# Patient Record
Sex: MALE | Employment: UNEMPLOYED | URBAN - METROPOLITAN AREA
[De-identification: names, ages, dates, MRNs, and addresses within clinical notes are randomized per-mention and may not be internally consistent; named-entity substitution may affect disease eponyms.]

---

## 2023-11-17 ENCOUNTER — HOSPITAL ENCOUNTER (EMERGENCY)
Age: 28
Discharge: HOME | End: 2023-11-18
Attending: EMERGENCY MEDICINE

## 2023-11-17 ENCOUNTER — ANESTHESIA (OUTPATIENT)
Dept: ENDOSCOPY | Age: 28
End: 2023-11-17

## 2023-11-17 ENCOUNTER — ANESTHESIA EVENT (OUTPATIENT)
Dept: ENDOSCOPY | Age: 28
End: 2023-11-17

## 2023-11-17 ENCOUNTER — APPOINTMENT (OUTPATIENT)
Dept: GENERAL RADIOLOGY | Age: 28
End: 2023-11-17

## 2023-11-17 DIAGNOSIS — T18.128A ESOPHAGEAL OBSTRUCTION DUE TO FOOD IMPACTION: Primary | ICD-10-CM

## 2023-11-17 DIAGNOSIS — K22.2 ESOPHAGEAL OBSTRUCTION DUE TO FOOD IMPACTION: Primary | ICD-10-CM

## 2023-11-17 LAB
ANION GAP SERPL CALCULATED.3IONS-SCNC: 13 MMOL/L (ref 3–16)
BASOPHILS # BLD: 0.1 K/UL (ref 0–0.2)
BASOPHILS NFR BLD: 1 %
BUN SERPL-MCNC: 10 MG/DL (ref 7–20)
CALCIUM SERPL-MCNC: 9.4 MG/DL (ref 8.3–10.6)
CHLORIDE SERPL-SCNC: 103 MMOL/L (ref 99–110)
CO2 SERPL-SCNC: 24 MMOL/L (ref 21–32)
CREAT SERPL-MCNC: 1 MG/DL (ref 0.9–1.3)
DEPRECATED RDW RBC AUTO: 14.1 % (ref 12.4–15.4)
EOSINOPHIL # BLD: 0.3 K/UL (ref 0–0.6)
EOSINOPHIL NFR BLD: 2.6 %
GFR SERPLBLD CREATININE-BSD FMLA CKD-EPI: >60 ML/MIN/{1.73_M2}
GLUCOSE SERPL-MCNC: 117 MG/DL (ref 70–99)
HCT VFR BLD AUTO: 37.9 % (ref 40.5–52.5)
HGB BLD-MCNC: 12.4 G/DL (ref 13.5–17.5)
LYMPHOCYTES # BLD: 4.1 K/UL (ref 1–5.1)
LYMPHOCYTES NFR BLD: 42.9 %
MAGNESIUM SERPL-MCNC: 1.8 MG/DL (ref 1.8–2.4)
MCH RBC QN AUTO: 26.4 PG (ref 26–34)
MCHC RBC AUTO-ENTMCNC: 32.6 G/DL (ref 31–36)
MCV RBC AUTO: 81 FL (ref 80–100)
MONOCYTES # BLD: 0.9 K/UL (ref 0–1.3)
MONOCYTES NFR BLD: 9.9 %
NEUTROPHILS # BLD: 4.2 K/UL (ref 1.7–7.7)
NEUTROPHILS NFR BLD: 43.6 %
PLATELET # BLD AUTO: 262 K/UL (ref 135–450)
PMV BLD AUTO: 8.9 FL (ref 5–10.5)
POTASSIUM SERPL-SCNC: 3.4 MMOL/L (ref 3.5–5.1)
RBC # BLD AUTO: 4.68 M/UL (ref 4.2–5.9)
SODIUM SERPL-SCNC: 140 MMOL/L (ref 136–145)
WBC # BLD AUTO: 9.6 K/UL (ref 4–11)

## 2023-11-17 PROCEDURE — 96375 TX/PRO/DX INJ NEW DRUG ADDON: CPT

## 2023-11-17 PROCEDURE — 6360000002 HC RX W HCPCS: Performed by: EMERGENCY MEDICINE

## 2023-11-17 PROCEDURE — 3700000001 HC ADD 15 MINUTES (ANESTHESIA): Performed by: INTERNAL MEDICINE

## 2023-11-17 PROCEDURE — 2709999900 HC NON-CHARGEABLE SUPPLY: Performed by: INTERNAL MEDICINE

## 2023-11-17 PROCEDURE — 80048 BASIC METABOLIC PNL TOTAL CA: CPT

## 2023-11-17 PROCEDURE — 7100000000 HC PACU RECOVERY - FIRST 15 MIN: Performed by: INTERNAL MEDICINE

## 2023-11-17 PROCEDURE — 3700000000 HC ANESTHESIA ATTENDED CARE: Performed by: INTERNAL MEDICINE

## 2023-11-17 PROCEDURE — 2580000003 HC RX 258: Performed by: EMERGENCY MEDICINE

## 2023-11-17 PROCEDURE — 99285 EMERGENCY DEPT VISIT HI MDM: CPT

## 2023-11-17 PROCEDURE — 6360000002 HC RX W HCPCS: Performed by: ANESTHESIOLOGY

## 2023-11-17 PROCEDURE — 83735 ASSAY OF MAGNESIUM: CPT

## 2023-11-17 PROCEDURE — 3609012900 HC EGD FOREIGN BODY REMOVAL: Performed by: INTERNAL MEDICINE

## 2023-11-17 PROCEDURE — 71045 X-RAY EXAM CHEST 1 VIEW: CPT

## 2023-11-17 PROCEDURE — 2720000010 HC SURG SUPPLY STERILE: Performed by: INTERNAL MEDICINE

## 2023-11-17 PROCEDURE — 85025 COMPLETE CBC W/AUTO DIFF WBC: CPT

## 2023-11-17 PROCEDURE — 96374 THER/PROPH/DIAG INJ IV PUSH: CPT

## 2023-11-17 PROCEDURE — 7100000001 HC PACU RECOVERY - ADDTL 15 MIN: Performed by: INTERNAL MEDICINE

## 2023-11-17 RX ORDER — SODIUM CHLORIDE, SODIUM LACTATE, POTASSIUM CHLORIDE, AND CALCIUM CHLORIDE .6; .31; .03; .02 G/100ML; G/100ML; G/100ML; G/100ML
1000 INJECTION, SOLUTION INTRAVENOUS ONCE
Status: COMPLETED | OUTPATIENT
Start: 2023-11-17 | End: 2023-11-17

## 2023-11-17 RX ORDER — SUCCINYLCHOLINE CHLORIDE 20 MG/ML
INJECTION INTRAMUSCULAR; INTRAVENOUS PRN
Status: DISCONTINUED | OUTPATIENT
Start: 2023-11-17 | End: 2023-11-17 | Stop reason: SDUPTHER

## 2023-11-17 RX ORDER — ONDANSETRON 2 MG/ML
4 INJECTION INTRAMUSCULAR; INTRAVENOUS ONCE
Status: COMPLETED | OUTPATIENT
Start: 2023-11-17 | End: 2023-11-17

## 2023-11-17 RX ORDER — GLUCAGON 1 MG/ML
1 KIT INJECTION ONCE
Status: COMPLETED | OUTPATIENT
Start: 2023-11-17 | End: 2023-11-17

## 2023-11-17 RX ORDER — PROPOFOL 10 MG/ML
INJECTION, EMULSION INTRAVENOUS PRN
Status: DISCONTINUED | OUTPATIENT
Start: 2023-11-17 | End: 2023-11-17 | Stop reason: SDUPTHER

## 2023-11-17 RX ADMIN — SODIUM CHLORIDE, POTASSIUM CHLORIDE, SODIUM LACTATE AND CALCIUM CHLORIDE 1000 ML: 600; 310; 30; 20 INJECTION, SOLUTION INTRAVENOUS at 19:29

## 2023-11-17 RX ADMIN — PROPOFOL 100 MG: 10 INJECTION, EMULSION INTRAVENOUS at 22:21

## 2023-11-17 RX ADMIN — PROPOFOL 100 MG: 10 INJECTION, EMULSION INTRAVENOUS at 22:18

## 2023-11-17 RX ADMIN — PROPOFOL 100 MG: 10 INJECTION, EMULSION INTRAVENOUS at 22:24

## 2023-11-17 RX ADMIN — PROPOFOL 100 MG: 10 INJECTION, EMULSION INTRAVENOUS at 22:16

## 2023-11-17 RX ADMIN — ONDANSETRON 4 MG: 2 INJECTION INTRAMUSCULAR; INTRAVENOUS at 19:31

## 2023-11-17 RX ADMIN — SUCCINYLCHOLINE CHLORIDE 120 MG: 20 INJECTION, SOLUTION INTRAMUSCULAR; INTRAVENOUS; PARENTERAL at 22:24

## 2023-11-17 RX ADMIN — GLUCAGON 1 MG: KIT at 19:31

## 2023-11-17 ASSESSMENT — PAIN SCALES - GENERAL: PAINLEVEL_OUTOF10: 0

## 2023-11-17 ASSESSMENT — PAIN - FUNCTIONAL ASSESSMENT: PAIN_FUNCTIONAL_ASSESSMENT: 0-10

## 2023-11-18 VITALS
HEIGHT: 70 IN | TEMPERATURE: 99 F | HEART RATE: 88 BPM | DIASTOLIC BLOOD PRESSURE: 84 MMHG | RESPIRATION RATE: 20 BRPM | SYSTOLIC BLOOD PRESSURE: 128 MMHG | OXYGEN SATURATION: 99 %

## 2023-11-18 ASSESSMENT — PAIN SCALES - GENERAL: PAINLEVEL_OUTOF10: 0

## 2023-11-18 ASSESSMENT — PAIN - FUNCTIONAL ASSESSMENT: PAIN_FUNCTIONAL_ASSESSMENT: 0-10

## 2023-11-18 NOTE — ED PROVIDER NOTES
[address] [facility] ENCOUNTER          ATTENDING PHYSICIAN NOTE       Date of evaluation: 11/17/2023    ADDENDUM:      Care of this patient was assumed from my colleague, Dr. Loreto Andersen. The patient was seen for Food Stuck in Throat (Pt. states he was eating a piece of lamb, became lodged in throat. O2 100% on RA upon arrival to ED. )  . The patient's initial evaluation and plan have been discussed with the prior provider who initially evaluated the patient. Nursing Notes, Past Medical Hx, Past Surgical Hx, Social Hx, Allergies, and Family Hx were all reviewed. At the time of turnover, the remaining items are still pending in her work-up and will be followed up by myself: f/u after endo FB removal. .       Diagnostic Results     Labs:  Please see electronic medical record for any tests performed in the ED     Radiology:  Please see electronic medical record for any tests performed in the ED    Procedures     ED Course     The patient was given the following medications:  Orders Placed This Encounter   Medications    DISCONTD: glucagon injection 1 mg    ondansetron (ZOFRAN) injection 4 mg    lactated ringers bolus bolus 1,000 mL    DISCONTD: glucagon injection 1 mg    glucagon injection 1 mg     CONSULTS:  IP CONSULT TO GI    Scotty Tran / SOOC / Brett Whalendenny is a 24 y.o. male with no past medical history who presented to the emergency department as a transfer from liquid with food bolus who subsequently was evaluated in endoscopy with removal of impacted food and return to the emergency department. He is tolerating p.o. and has no complaints apart from some mild soreness. Discussed adequate chewing and small food boluses followed by water or other liquids with every bite. If he continues to have any difficulty swallowing, he can follow-up with primary care physician and discuss GI referral.    Risk:  The patient presented for concern of food impaction.     Problem: The patient had an acute problem/illness that posed a threat to life or bodily function. Data:  External data reviewed: No. Please see HPI and/or MDM. Labs: Not ordered. Details documented/discussed in MDM. Independent historian: None. Please see HPI and/or MDM. Radiology: Not ordered. Details documented in MDM. Discussed with additional healthcare providers: Consultant - please see above for full details. Medications given in the ED or prescribed at discharge:  Medications   ondansetron [facility]) injection 4 mg (4 mg IntraVENous Given 11/17/23 1931)   lactated ringers bolus bolus 1,000 mL (0 mLs IntraVENous Stopped 11/17/23 2137)   glucagon injection 1 mg (1 mg IntraVENous Given 11/17/23 1931)       Disposition: Discharge    Clinical Impression     1. Esophageal obstruction due to food impaction        Disposition     PATIENT REFERRED TO:  No follow-up provider specified.     DISCHARGE MEDICATIONS:  New Prescriptions    No medications on file       DISPOSITION Decision To Transfer 11/17/2023 08:15:38 PM       Hola Mariee MD  11/18/23 3182

## 2023-11-18 NOTE — ED NOTES
Pt to [facility] per [facility].       Kosta Watkins RN  11/17/23 2059

## 2023-11-18 NOTE — ANESTHESIA PRE PROCEDURE
PM    CO2 24 11/17/2023 07:18 PM    BUN 10 11/17/2023 07:18 PM    CREATININE 1.0 11/17/2023 07:18 PM    LABGLOM >60 11/17/2023 07:18 PM    GLUCOSE 117 11/17/2023 07:18 PM    CALCIUM 9.4 11/17/2023 07:18 PM       POC Tests: No results for input(s): \"POCGLU\", \"POCNA\", \"POCK\", \"POCCL\", \"POCBUN\", \"POCHEMO\", \"POCHCT\" in the last 72 hours. Coags: No results found for: \"PROTIME\", \"INR\", \"APTT\"    HCG (If Applicable): No results found for: \"PREGTESTUR\", \"PREGSERUM\", \"HCG\", \"HCGQUANT\"     ABGs: No results found for: \"PHART\", \"PO2ART\", \"JCJ3YQP\", \"FJN3JVM\", \"BEART\", \"D2AIIWOW\"     Type & Screen (If Applicable):  No results found for: \"LABABO\", \"LABRH\"    Drug/Infectious Status (If Applicable):  No results found for: \"HIV\", \"HEPCAB\"    COVID-19 Screening (If Applicable): No results found for: \"COVID19\"        Anesthesia Evaluation    Airway: Mallampati: II  TM distance: >3 FB   Neck ROM: full  Mouth opening: > = 3 FB   Dental:          Pulmonary:                              Cardiovascular:            Rhythm: regular  Rate: normal                    Neuro/Psych:               GI/Hepatic/Renal:             Endo/Other:                     Abdominal:             Vascular: Other Findings:           Anesthesia Plan      MAC and general     ASA 1 - emergent       Induction: intravenous. Anesthetic plan and risks discussed with patient. Plan discussed with CRNA.                   Ruth Ram MD   11/17/2023

## 2023-11-18 NOTE — PROGRESS NOTES
Received report from Suni Boo RN and Dr. Symone Blank of anesthesiology. Pt alert; speaks [place] only. Breathing easily on 10 L NRB, sats 100%; with Dr. Symone Blank at bedside, this RN removed O2 and pt remains 100% on RA. No distress exhibited. Intermittent dry cough. Currently resting quietly. MD states okay to take pt back to ER.

## 2023-11-18 NOTE — ED NOTES
Report called to [facility] ED RN TYREL Call RN and informed of florence Pichardo RN  11/17/23 2100

## 2023-11-18 NOTE — ED PROVIDER NOTES
[facility]          ATTENDING PHYSICIAN NOTE       Date of evaluation: 11/17/2023    Chief Complaint     Food Stuck in Throat (Pt. states he was eating a piece of lamb, became lodged in throat. O2 100% on RA upon arrival to ED. )      History of Present Illness     Dennys Rivera is a 24 y.o. male who presents ***    Review of Systems     Review of Systems    Past Medical, Surgical, Family, and Social History     He has no past medical history on file. He has no past surgical history on file. His family history is not on file. He     Medications     Previous Medications    No medications on file       Allergies     He has no allergies on file. Physical Exam     INITIAL VITALS: BP: (!) 152/81,  , Pulse: (!) 126, Respirations: 20, SpO2: 100 %     General: Well appearing. NAD. HEENT: Pupils are equal, round, and reactive to light. Extraocular muscles are intact. Conjunctivae are clear and moist. No redness or drainage from the eyes. No drainage from the nose. The oropharynx appeared to be normal.    Neck: Supple, with full range of motion. No midline C-spine tenderness to palpation, crepitus, or step-offs. Back: No CVA tenderness. No midline T or L spine tenderness to palpation, crepitus, or step-offs. Chest: Not tender to palpation. Cardiovascular: Normal S1-S2 without murmur rub or gallop. 2+ radial pulses bilaterally. 2+ DP pulses bilaterally. Respiratory: Unlabored breathing with equal chest rise and fall. Lungs are clear to auscultation bilaterally. No adventitious lung sounds heard. Abdomen: Soft and nontender, without guarding or rebound tenderness. No masses or hepatosplenomegaly. Skin: Warm and dry, without rashes or ecchymoses, lacerations or abrasions. Neuro: Alert and oriented x3. No focal neurologic deficits are noted. *** (Detailed Option)  Cranial nerves II-XII are grossly intact to testing.   Strength is 5/5 in all proximal and

## 2023-11-18 NOTE — ANESTHESIA POSTPROCEDURE EVALUATION
Department of Anesthesiology  Postprocedure Note    Patient: López Johnson  MRN: [id number]  YOB: 1995  Date of evaluation: 11/17/2023      Procedure Summary       Date: 11/17/23 Room / Location: [address] [place] [address]    Anesthesia Start: 2215 Anesthesia Stop: 2319    Procedure: EGD FOREIGN BODY REMOVAL Diagnosis:       Food impaction of esophagus, initial encounter      (Food impaction of esophagus, initial encounter Jordan Rodas)    Surgeons: Jerman Walters MD Responsible Provider: Ghassan Huitron MD    Anesthesia Type: MAC, general ASA Status: 1 - Emergent            Anesthesia Type: No value filed.     Sophy Phase I: Sophy Score: 10    Sophy Phase II:        Anesthesia Post Evaluation    Patient location during evaluation: PACU  Level of consciousness: awake  Complications: no  Multimodal analgesia pain management approach

## 2023-11-18 NOTE — BRIEF OP NOTE
EGD:   food bolus removed. Mild proximal esophageal ring. Rec:  soft diet            F/u outpt GI for EGD.

## 2023-11-18 NOTE — PROGRESS NOTES
Patient arrived to PACU post EGD FOREIGN BODY REMOVAL with Dr. Caroline ROBBINS. Dr. Abdon Gallegos gave PACU RN report at bedside stating no complications during procedure. Patient shows no signs of pain at this time. Will continue to monitor.

## 2023-11-18 NOTE — ED NOTES
Shania at bedside to take patient to [address] [place] [address], [address] [place] [address], [address] [place] [address]  11/17/23 2734

## 2023-11-18 NOTE — ED PROVIDER NOTES
[address] ENCOUNTER        Patient Name: Ceasar Davis  MRN: [id number]  [address] [facility] 1995  Date of evaluation: 11/17/2023  Provider: Shannan Laureano MD  PCP: No primary care provider on file. Note Started: 7:12 PM EST 11/17/23    CHIEF COMPLAINT       Food Stuck in Throat (Pt. states he was eating a piece of lamb, became lodged in throat. O2 100% on RA upon arrival to ED. )      HISTORY OF PRESENT ILLNESS: 1 or more Elements     History from : Patient and Significant Other      Limitations to history : Language      Ceasar Davis is a 24 y.o. male who presents for evaluation of difficulty swallowing. Patient states that he was eating a piece of lamb about 15 to 20 minutes ago when he got stuck in his throat. This has not happened previously. I offered foreign  however significant other is currently translating. No chest pain or difficulty breathing. He has not been able to tolerate oral secretions and has been spitting into an emesis bag. No prior significant past medical history. Nursing Notes were all reviewed and agreed with or any disagreements were addressed in the HPI. REVIEW OF SYSTEMS :      Review of Systems    Positives and Pertinent negatives as per HPI. SURGICAL HISTORY   No past surgical history on file. CURRENTMEDICATIONS       Previous Medications    No medications on file       ALLERGIES     Patient has no allergy information on record. FAMILYHISTORY     No family history on file. SOCIAL HISTORY          SCREENINGS                         [place] Assessment  BP: (!) 152/81  Pulse: (!) 126           PHYSICAL EXAM  1 or more Elements     ED Triage Vitals [11/17/23 1904]   BP Temp Temp src Pulse Respirations SpO2 Height Weight   (!) 152/81 -- -- (!) 126 20 100 % 1.778 m (5' 10\") --       General: No acute distress. Alert and Oriented. Appears stated age. Spitting into emesis basin.   HEENT:  No difficulty patient was discussed with gastroenterology, Dr. Jenny Tyalor. Patient did not have response to Zofran, glucagon and will likely require endoscopy for further evaluation of esophageal food bolus. Gastroenterology advised on ED to ED transfer, I spoke with the ED attending at [facility], Dr. Brielle Bartholomew who is excepted the patient for transfer. Patient and family is agreeable with plan for transfer. He has remained hemodynamically stable continues to protect his airway. Social Determinants : None    Patient's care impacted by chronic condition(s): No past medical history on file. Records Reviewed : None    Clinical information obtained from an independent historian. History obtained from or confirmed by (insert source: significant other    Disposition Considerations (include 1 Tests not done, Shared Decision Making, Pt Expectation of Test or Tx.):         I am the Primary Clinician of Record. FINAL IMPRESSION      1. Esophageal obstruction due to food impaction          DISPOSITION/PLAN     DISPOSITION Decision To Transfer 11/17/2023 08:15:38 PM      PATIENT REFERRED TO:  No follow-up provider specified. DISCHARGE MEDICATIONS:  Patient was given scripts for the following medications. I counseled patient how to take these medications:  New Prescriptions    No medications on file       DISCONTINUED MEDICATIONS:  Discontinued Medications    No medications on file              (This chart was generated in part by using Dragon Dictation system and may contain errors related to that system including errors in grammar, punctuation, and spelling, as well as words and phrases that may be inappropriate.  If there are any questions or concerns please feel free to contact the dictating provider for clarification.)    MD Michelle Gonzalez MD  11/17/23 2017

## (undated) DEVICE — DEVICE GRSP L230CM SHTH DIA2.4MM HYBRID JAW FLX DST WIRE

## (undated) DEVICE — PACK SURG LIGATOR 8.6-11.6 MM PK SMARTBAND SAFEGRIP LF

## (undated) DEVICE — RETRIEVER ENDOSCP L160CM SHTH DIA2.5MM NET 4X5.5CM PLAT